# Patient Record
Sex: FEMALE | Race: OTHER | NOT HISPANIC OR LATINO
[De-identification: names, ages, dates, MRNs, and addresses within clinical notes are randomized per-mention and may not be internally consistent; named-entity substitution may affect disease eponyms.]

---

## 2017-08-16 ENCOUNTER — RX ONLY (OUTPATIENT)
Age: 52
Setting detail: RX ONLY
End: 2017-08-16

## 2019-04-03 ENCOUNTER — HOSPITAL ENCOUNTER (OUTPATIENT)
Dept: RADIOLOGY | Facility: MEDICAL CENTER | Age: 54
End: 2019-04-03
Attending: OBSTETRICS & GYNECOLOGY
Payer: COMMERCIAL

## 2019-04-03 DIAGNOSIS — Z12.31 VISIT FOR SCREENING MAMMOGRAM: ICD-10-CM

## 2019-04-03 PROCEDURE — 77063 BREAST TOMOSYNTHESIS BI: CPT

## 2019-04-04 ENCOUNTER — HOSPITAL ENCOUNTER (OUTPATIENT)
Dept: RADIOLOGY | Facility: MEDICAL CENTER | Age: 54
End: 2019-04-04

## 2019-06-26 ENCOUNTER — OFFICE VISIT (OUTPATIENT)
Dept: NEUROLOGY | Facility: MEDICAL CENTER | Age: 54
End: 2019-06-26
Payer: COMMERCIAL

## 2019-06-26 VITALS
BODY MASS INDEX: 20.33 KG/M2 | TEMPERATURE: 98.7 F | HEIGHT: 65 IN | DIASTOLIC BLOOD PRESSURE: 68 MMHG | OXYGEN SATURATION: 99 % | HEART RATE: 70 BPM | SYSTOLIC BLOOD PRESSURE: 100 MMHG | RESPIRATION RATE: 16 BRPM | WEIGHT: 122 LBS

## 2019-06-26 DIAGNOSIS — G43.009 MIGRAINE WITHOUT AURA AND WITHOUT STATUS MIGRAINOSUS, NOT INTRACTABLE: ICD-10-CM

## 2019-06-26 PROCEDURE — 99204 OFFICE O/P NEW MOD 45 MIN: CPT | Performed by: PSYCHIATRY & NEUROLOGY

## 2019-06-26 RX ORDER — SUMATRIPTAN 25 MG/1
TABLET, FILM COATED ORAL
Refills: 1 | COMMUNITY
Start: 2019-06-11 | End: 2019-06-26 | Stop reason: SDUPTHER

## 2019-06-26 RX ORDER — LEVONORGESTREL AND ETHINYL ESTRADIOL 0.1-0.02MG
KIT ORAL
Refills: 4 | COMMUNITY
Start: 2019-05-07 | End: 2023-01-06

## 2019-06-26 RX ORDER — M-VIT,TX,IRON,MINS/CALC/FOLIC 27MG-0.4MG
1 TABLET ORAL DAILY
COMMUNITY
End: 2023-01-06

## 2019-06-26 RX ORDER — SUMATRIPTAN 50 MG/1
50 TABLET, FILM COATED ORAL
Qty: 9 TAB | Refills: 2 | Status: SHIPPED | OUTPATIENT
Start: 2019-06-26

## 2019-06-26 RX ORDER — LEVOTHYROXINE SODIUM 112 MCG
TABLET ORAL
Refills: 11 | COMMUNITY
Start: 2019-06-11 | End: 2023-01-06

## 2019-06-26 ASSESSMENT — ENCOUNTER SYMPTOMS
SORE THROAT: 0
FALLS: 0
EYE DISCHARGE: 0
BRUISES/BLEEDS EASILY: 0
ABDOMINAL PAIN: 0
SHORTNESS OF BREATH: 0
WEIGHT LOSS: 0
HALLUCINATIONS: 0
FEVER: 0

## 2019-06-26 NOTE — LETTER
[facility]   [address], [address]  [place] [address]  Phone: [phone]  Fax: [phone]              Nighat Arreguin  1965    Encounter Date: 6/26/2019    Aura Hernandez M.D.          PROGRESS NOTE:  Chief Complaint   Patient presents with   • New Patient     Migraines     Patient is referred by Gina Suarez P.A. for initial consult.    History of present illness:  Nighat Arreguin 54 y.o. female presents today for headaches.   History is obtained from patient.  and Patient is accompanied by self.    Duration/timing: ~2015, one headache every 3 months, duration of 3 days  Context: Headaches; she is a  - stressful; she has had a fall on driveway and landed on her head with normal head scan; of note patient is on Retin A 0.025% occasionally for sun spots  Location: right temporal/paritetal sometimes front; uncertain if on left  Quality: throbbing, sharp  Severity: moderate to severe  Modifying factors: worse with light, activity, noise  Associated signs/symptoms: rarely nausea  Denies: bladder incontinence, bowel incontinence, dizziness, vision changes, weakness, numbness/tingling, swallowing difficulties, speech disturbance, incoordination, depression, anxiety, loss of consciousness, hallucinations, abnormal movements, diplopia, autonomic symptoms, falls and snoring/apnea during sleep, reproducible pain     Patient has tried:  -Sumatriptan 25 mg as needed - some benefit, no side effects   -Excedrin Migraine    Past medical history:   History reviewed. No pertinent past medical history.    Past surgical history:   Past Surgical History:   Procedure Laterality Date   • MAMMOPLASTY AUGMENTATION  2001   • ENDOMETRIAL ABLATION         Family history:   Family History   Problem Relation Age of Onset   • Other Daughter         Migraines       Social history:   Social History   • Marital status:      Social History Main Topics   • Smoking status: Never Smoker   •  "Smokeless tobacco: Never Used   • Alcohol use No   • Drug use: No     Current medications:   Current Outpatient Prescriptions   Medication   • LESSINA-28 0.1-20 MG-MCG per tablet   • SYNTHROID 112 MCG Tab   • therapeutic multivitamin-minerals (THERAGRAN-M) Tab   • SUMAtriptan (IMITREX) 50 MG Tab     No current facility-administered medications for this visit.        Medication Allergy:  Allergies not on file    Review of Systems   Constitutional: Negative for fever and weight loss.   HENT: Negative for sore throat.    Eyes: Negative for discharge.   Respiratory: Negative for shortness of breath.    Cardiovascular: Negative for leg swelling.   Gastrointestinal: Negative for abdominal pain.   Genitourinary: Negative for dysuria.   Musculoskeletal: Negative for falls.   Skin: Negative for rash.   Neurological:        As per HPI   Endo/Heme/Allergies: Does not bruise/bleed easily.   Psychiatric/Behavioral: Negative for hallucinations.       Physical examination:   Vitals:    06/26/19 1100   BP: 100/68   BP Location: Left arm   Patient Position: Sitting   BP Cuff Size: Adult   Pulse: 70   Resp: 16   Temp: 37.1 °C (98.7 °F)   TempSrc: Temporal   SpO2: 99%   Weight: 55.3 kg (122 lb)   Height: 1.651 m (5' 5\")     General: Patient in well nourished in no apparent distress.  Eyes: Ophthalmoscopic examination of the optic discs and posterior elements reveals sharp disk margins, normal vessels and pigmentation on right.  Cannot visualize left well enough to characterize.  HENT: Normocephalic, atraumatic. No tenderness to palpation of the MARION/GLEN  Cardiovascular: No lower extremity edema.  Respiratory: Normal respiratory effort.   Skin: No appreciable signs of acute rashes or bruising.   Musculoskeletal: No signs of joint or muscle swelling.   Psychiatric: Pleasant.     NEUROLOGICAL EXAM:   Mental status: Awake, alert and fully oriented to person, place, time and situation. Normal attention, concentration and fund of knowledge " for education level.   Speech and language: Speech is fluent without errors and clear.  Cranial nerve exam:  II: Pupils are equally round and reactive to light. Visual fields are intact by confrontation.  III, IV, VI: EOMI, no diplopia, no ptosis.  V: Sensation to light touch is normal over V1-3 distributions bilaterally.  .  VII: Facial movements are symmetrical. There is no facial droop. .  VIII: Hearing intact to soft speech and finger rub bilaterally  IX: Palate elevates symmetrically, uvula is midline. Dysarthria is not present.  XI: Shoulder shrug are symmetrical and strong.   XII: Tongue protrudes midline.    Motor exam:  Muscle tone is normal in all 4 limbs. and No abnormal movements appreciated.    Muscle strength:     Right  Left  Deltoid   5/5  5/5      Biceps   5/5  5/5  Triceps  5/5  5/5   Wrist extensors 5/5  5/5  Wrist flexors  5/5  5/5     5/5  5/5  Interossei  5/5  5/5  Thenar (APB)  NT/5  NT/5   Hip flexors  5/5  5/5  Quadriceps  5/5  5/5    Hamstrings  5/5  5/5  Dorsiflexors  5/5  5/5  Plantarflexors  5/5  5/5  Toe extension  NT/5  NT/5  NT = not tested    Sensory exam:  Intact to Light touch in bilateral upper and lower extremity.    Deep tendon reflexes:       Right  Left  Biceps   1/4  1/4  Triceps  0/4  0/4  Brachioradialis 0/4  0/4  Knee jerk  1/4  1/4  Ankle jerk  0/4  0/4   bilateral toes are downgoing to plantar stimulation..    Coordination: shows a normal finger-nose-finger and heel to shin bilaterally.   Gait: Casual gait is normal.      ANCILLARY DATA REVIEWED:   Lab Data Review: none  Imaging: none  Records reviewed: Referral note personally reviewed.  Medications as documented in HPI.  Patient had been having increasing migraines.        ASSESSMENT AND PLAN:    1. Migraine without aura and without status migrainosus, not intractable: Based on clinical history.  Responding to as needed sumatriptan when taken appropriately.  No red flag symptoms of focal exam findings to suggest  secondary etiology.  Patient uses the occasional retinoic acid but is reportedly seldom and not regular.  Lower suspicion for IIH.  Trial of higher dose of sumatriptan.  Due to the frequency preventative therapy is not indicated at this time.  - SUMAtriptan (IMITREX) 50 MG Tab; Take 1 Tab by mouth Once PRN for Migraine for up to 1 dose.  Dispense: 9 Tab; Refill: 2  -Consider brain imaging if worsening, or other concerning symptoms      FOLLOW-UP: Return for 3-4 months.  To ensure adequate abortive therapy.  EDUCATION AND COUNSELING:  -I personally discussed the following with the patient:   Risks/benefits/side effects/alternatives of medication including but not limited to drowsiness, sedation, dizziness, hypersensitivity reactions including rash (which may be fatal), avoid pregnancy while taking prescribed drugs unless discussed with physician (fetal side effects include: congenital malformations, developmental and intellectual disability) and Chest pain/shortness of breath/hypertension., Diagnosis, prognosis, and treatment options discussed with patient at length.   and Recommend regular exercise, proper hydration, healthy diet and stress reduction.     The patient understands and agrees that due to the complexity of his/her diagnosis, results of any testing and further recommendations will typically be discussed/made during a face to face encounter in my office. The patient and/or family further understands it is their responsibility to keep proper follow up.     Disclaimer  This dictation was created using voice recognition software. I have made every reasonable attempt to avoid dictation errors, but this document may contain an error not identified before finalizing. If the error changes the accuracy of the document, I would appreciate it being brought to my attention. Thank you very much.     Aura Hernandez MD  Neurology, Neurophysiology  [facility]      No Recipients

## 2019-06-26 NOTE — PROGRESS NOTES
Chief Complaint   Patient presents with   • New Patient     Migraines     Patient is referred by Gina Suarez P.A. for initial consult.    History of present illness:  Nighat Arreguin 54 y.o. female presents today for headaches.   History is obtained from patient.  and Patient is accompanied by self.    Duration/timing: ~2015, one headache every 3 months, duration of 3 days  Context: Headaches; she is a  - stressful; she has had a fall on driveway and landed on her head with normal head scan; of note patient is on Retin A 0.025% occasionally for sun spots  Location: right temporal/paritetal sometimes front; uncertain if on left  Quality: throbbing, sharp  Severity: moderate to severe  Modifying factors: worse with light, activity, noise  Associated signs/symptoms: rarely nausea  Denies: bladder incontinence, bowel incontinence, dizziness, vision changes, weakness, numbness/tingling, swallowing difficulties, speech disturbance, incoordination, depression, anxiety, loss of consciousness, hallucinations, abnormal movements, diplopia, autonomic symptoms, falls and snoring/apnea during sleep, reproducible pain     Patient has tried:  -Sumatriptan 25 mg as needed - some benefit, no side effects   -Excedrin Migraine    Past medical history:   History reviewed. No pertinent past medical history.    Past surgical history:   Past Surgical History:   Procedure Laterality Date   • MAMMOPLASTY AUGMENTATION  2001   • ENDOMETRIAL ABLATION         Family history:   Family History   Problem Relation Age of Onset   • Other Daughter         Migraines       Social history:   Social History   • Marital status:      Social History Main Topics   • Smoking status: Never Smoker   • Smokeless tobacco: Never Used   • Alcohol use No   • Drug use: No     Current medications:   Current Outpatient Prescriptions   Medication   • LESSINA-28 0.1-20 MG-MCG per tablet   • SYNTHROID 112 MCG Tab   • therapeutic multivitamin-minerals  "(THERAGRAN-M) Tab   • SUMAtriptan (IMITREX) 50 MG Tab     No current facility-administered medications for this visit.        Medication Allergy:  Allergies not on file    Review of Systems   Constitutional: Negative for fever and weight loss.   HENT: Negative for sore throat.    Eyes: Negative for discharge.   Respiratory: Negative for shortness of breath.    Cardiovascular: Negative for leg swelling.   Gastrointestinal: Negative for abdominal pain.   Genitourinary: Negative for dysuria.   Musculoskeletal: Negative for falls.   Skin: Negative for rash.   Neurological:        As per HPI   Endo/Heme/Allergies: Does not bruise/bleed easily.   Psychiatric/Behavioral: Negative for hallucinations.       Physical examination:   Vitals:    06/26/19 1100   BP: 100/68   BP Location: Left arm   Patient Position: Sitting   BP Cuff Size: Adult   Pulse: 70   Resp: 16   Temp: 37.1 °C (98.7 °F)   TempSrc: Temporal   SpO2: 99%   Weight: 55.3 kg (122 lb)   Height: 1.651 m (5' 5\")     General: Patient in well nourished in no apparent distress.  Eyes: Ophthalmoscopic examination of the optic discs and posterior elements reveals sharp disk margins, normal vessels and pigmentation on right.  Cannot visualize left well enough to characterize.  HENT: Normocephalic, atraumatic. No tenderness to palpation of the MARION/GLEN  Cardiovascular: No lower extremity edema.  Respiratory: Normal respiratory effort.   Skin: No appreciable signs of acute rashes or bruising.   Musculoskeletal: No signs of joint or muscle swelling.   Psychiatric: Pleasant.     NEUROLOGICAL EXAM:   Mental status: Awake, alert and fully oriented to person, place, time and situation. Normal attention, concentration and fund of knowledge for education level.   Speech and language: Speech is fluent without errors and clear.  Cranial nerve exam:  II: Pupils are equally round and reactive to light. Visual fields are intact by confrontation.  III, IV, VI: EOMI, no diplopia, no " ptosis.  V: Sensation to light touch is normal over V1-3 distributions bilaterally.  .  VII: Facial movements are symmetrical. There is no facial droop. .  VIII: Hearing intact to soft speech and finger rub bilaterally  IX: Palate elevates symmetrically, uvula is midline. Dysarthria is not present.  XI: Shoulder shrug are symmetrical and strong.   XII: Tongue protrudes midline.    Motor exam:  Muscle tone is normal in all 4 limbs. and No abnormal movements appreciated.    Muscle strength:     Right  Left  Deltoid   5/5  5/5      Biceps   5/5  5/5  Triceps  5/5  5/5   Wrist extensors 5/5  5/5  Wrist flexors  5/5  5/5     5/5  5/5  Interossei  5/5  5/5  Thenar (APB)  NT/5  NT/5   Hip flexors  5/5  5/5  Quadriceps  5/5  5/5    Hamstrings  5/5  5/5  Dorsiflexors  5/5  5/5  Plantarflexors  5/5  5/5  Toe extension  NT/5  NT/5  NT = not tested    Sensory exam:  Intact to Light touch in bilateral upper and lower extremity.    Deep tendon reflexes:       Right  Left  Biceps   1/4  1/4  Triceps  0/4  0/4  Brachioradialis 0/4  0/4  Knee jerk  1/4  1/4  Ankle jerk  0/4  0/4   bilateral toes are downgoing to plantar stimulation..    Coordination: shows a normal finger-nose-finger and heel to shin bilaterally.   Gait: Casual gait is normal.      ANCILLARY DATA REVIEWED:   Lab Data Review: none  Imaging: none  Records reviewed: Referral note personally reviewed.  Medications as documented in HPI.  Patient had been having increasing migraines.        ASSESSMENT AND PLAN:    1. Migraine without aura and without status migrainosus, not intractable: Based on clinical history.  Responding to as needed sumatriptan when taken appropriately.  No red flag symptoms of focal exam findings to suggest secondary etiology.  Patient uses the occasional retinoic acid but is reportedly seldom and not regular.  Lower suspicion for IIH.  Trial of higher dose of sumatriptan.  Due to the frequency preventative therapy is not indicated at this  time.  - SUMAtriptan (IMITREX) 50 MG Tab; Take 1 Tab by mouth Once PRN for Migraine for up to 1 dose.  Dispense: 9 Tab; Refill: 2  -Consider brain imaging if worsening, or other concerning symptoms      FOLLOW-UP: Return for 3-4 months.  To ensure adequate abortive therapy.  EDUCATION AND COUNSELING:  -I personally discussed the following with the patient:   Risks/benefits/side effects/alternatives of medication including but not limited to drowsiness, sedation, dizziness, hypersensitivity reactions including rash (which may be fatal), avoid pregnancy while taking prescribed drugs unless discussed with physician (fetal side effects include: congenital malformations, developmental and intellectual disability) and Chest pain/shortness of breath/hypertension., Diagnosis, prognosis, and treatment options discussed with patient at length.   and Recommend regular exercise, proper hydration, healthy diet and stress reduction.     The patient understands and agrees that due to the complexity of his/her diagnosis, results of any testing and further recommendations will typically be discussed/made during a face to face encounter in my office. The patient and/or family further understands it is their responsibility to keep proper follow up.     Disclaimer  This dictation was created using voice recognition software. I have made every reasonable attempt to avoid dictation errors, but this document may contain an error not identified before finalizing. If the error changes the accuracy of the document, I would appreciate it being brought to my attention. Thank you very much.     Aura Hernandez MD  Neurology, Neurophysiology  [facility]

## 2020-03-26 ENCOUNTER — HOSPITAL ENCOUNTER (OUTPATIENT)
Dept: LAB | Facility: MEDICAL CENTER | Age: 55
End: 2020-03-26
Attending: OBSTETRICS & GYNECOLOGY
Payer: COMMERCIAL

## 2020-03-26 LAB
FSH SERPL-ACNC: 193 MIU/ML
LH SERPL-ACNC: 75.9 IU/L

## 2020-03-26 PROCEDURE — 36415 COLL VENOUS BLD VENIPUNCTURE: CPT

## 2020-03-26 PROCEDURE — 83001 ASSAY OF GONADOTROPIN (FSH): CPT

## 2020-03-26 PROCEDURE — 83002 ASSAY OF GONADOTROPIN (LH): CPT

## 2020-09-08 ENCOUNTER — TELEPHONE (OUTPATIENT)
Dept: DERMATOLOGY | Facility: IMAGING CENTER | Age: 55
End: 2020-09-08

## 2020-09-08 NOTE — TELEPHONE ENCOUNTER
NATASHA for patient asking her to return my call at [facility]. I received her online inquiry about scheduling an appointment with our office. Phone number was provided.

## 2021-07-02 ENCOUNTER — HOSPITAL ENCOUNTER (OUTPATIENT)
Dept: RADIOLOGY | Facility: MEDICAL CENTER | Age: 56
End: 2021-07-02
Attending: OBSTETRICS & GYNECOLOGY
Payer: COMMERCIAL

## 2021-07-02 DIAGNOSIS — Z12.31 VISIT FOR SCREENING MAMMOGRAM: ICD-10-CM

## 2021-07-02 PROCEDURE — 77063 BREAST TOMOSYNTHESIS BI: CPT

## 2023-01-06 ENCOUNTER — HOSPITAL ENCOUNTER (OUTPATIENT)
Facility: MEDICAL CENTER | Age: 58
End: 2023-01-06
Attending: STUDENT IN AN ORGANIZED HEALTH CARE EDUCATION/TRAINING PROGRAM
Payer: COMMERCIAL

## 2023-01-06 ENCOUNTER — OFFICE VISIT (OUTPATIENT)
Dept: URGENT CARE | Facility: PHYSICIAN GROUP | Age: 58
End: 2023-01-06
Payer: COMMERCIAL

## 2023-01-06 VITALS
BODY MASS INDEX: 20.63 KG/M2 | SYSTOLIC BLOOD PRESSURE: 112 MMHG | TEMPERATURE: 98.3 F | DIASTOLIC BLOOD PRESSURE: 72 MMHG | HEART RATE: 68 BPM | WEIGHT: 128.4 LBS | OXYGEN SATURATION: 97 % | HEIGHT: 66 IN

## 2023-01-06 DIAGNOSIS — R30.0 DYSURIA: ICD-10-CM

## 2023-01-06 LAB
APPEARANCE UR: NORMAL
BILIRUB UR STRIP-MCNC: NEGATIVE MG/DL
COLOR UR AUTO: YELLOW
GLUCOSE UR STRIP.AUTO-MCNC: NEGATIVE MG/DL
KETONES UR STRIP.AUTO-MCNC: NEGATIVE MG/DL
LEUKOCYTE ESTERASE UR QL STRIP.AUTO: NORMAL
NITRITE UR QL STRIP.AUTO: NEGATIVE
PH UR STRIP.AUTO: 6 [PH] (ref 5–8)
PROT UR QL STRIP: NEGATIVE MG/DL
RBC UR QL AUTO: NORMAL
SP GR UR STRIP.AUTO: 1.01
UROBILINOGEN UR STRIP-MCNC: 0.2 MG/DL

## 2023-01-06 PROCEDURE — 87186 SC STD MICRODIL/AGAR DIL: CPT

## 2023-01-06 PROCEDURE — 87086 URINE CULTURE/COLONY COUNT: CPT

## 2023-01-06 PROCEDURE — 81002 URINALYSIS NONAUTO W/O SCOPE: CPT | Performed by: STUDENT IN AN ORGANIZED HEALTH CARE EDUCATION/TRAINING PROGRAM

## 2023-01-06 PROCEDURE — 99203 OFFICE O/P NEW LOW 30 MIN: CPT | Performed by: STUDENT IN AN ORGANIZED HEALTH CARE EDUCATION/TRAINING PROGRAM

## 2023-01-06 PROCEDURE — 87077 CULTURE AEROBIC IDENTIFY: CPT

## 2023-01-06 RX ORDER — LEVOTHYROXINE SODIUM 88 UG/1
1 TABLET ORAL DAILY
COMMUNITY
Start: 2022-04-01 | End: 2023-03-31

## 2023-01-06 RX ORDER — SUMATRIPTAN 50 MG/1
TABLET, FILM COATED ORAL
COMMUNITY
Start: 2022-10-10 | End: 2023-01-06

## 2023-01-06 RX ORDER — CEFDINIR 300 MG/1
300 CAPSULE ORAL 2 TIMES DAILY
Qty: 10 CAPSULE | Refills: 0 | Status: SHIPPED | OUTPATIENT
Start: 2023-01-06 | End: 2023-01-11

## 2023-01-06 NOTE — PROGRESS NOTES
Subjective:   CHIEF COMPLAINT  Chief Complaint   Patient presents with    Painful Urination     Started with side pain in lower L back, concerns about kidney stones, pain, burning when peeing, noticed blood and severe pain, concerned about kidney stones, OTC: Azo, IBU       HPI  Nighat Arreguin is a 57 y.o. female who presents with a chief complaint of urinary frequency, burning with urination and slight blood in urine for approximately 1 week.  What ever she is currently using AZO and cranberry juice.  No current left flank pain, abdominal pain, fevers, nausea or vomiting.  No abnormal vaginal discharge or pruritus.  No concerns for STIs.    On ROS patient reports last month she was experiencing colicky left flank pain.  Symptoms return every few days described as sharp.  Then last week she had 1 day of severe pain and believes she possibly passed a stone.  She is no longer experiencing flank pain.  Negative ROS as described above.    REVIEW OF SYSTEMS  General: no fever or chills  GI: no nausea or vomiting  See HPI for further details.    PAST MEDICAL HISTORY  Patient Active Problem List    Diagnosis Date Noted    Migraine without aura and without status migrainosus, not intractable 06/26/2019       SURGICAL HISTORY   has a past surgical history that includes mammoplasty augmentation (2001) and endometrial ablation.    ALLERGIES  No Known Allergies    CURRENT MEDICATIONS  Home Medications       Reviewed by Guillermo Guevara D.O. (Physician) on 01/06/23 at 1336  Med List Status: <None>     Medication Last Dose Status   cefdinir (OMNICEF) 300 MG Cap  Active   levothyroxine (SYNTHROID) 88 MCG Tab Taking Active   SUMAtriptan (IMITREX) 50 MG Tab PRN Active                    SOCIAL HISTORY  Social History     Tobacco Use    Smoking status: Never    Smokeless tobacco: Never   Substance and Sexual Activity    Alcohol use: No    Drug use: No    Sexual activity: Not on file       FAMILY HISTORY  Family History   Problem  "Relation Age of Onset    Other Daughter         Migraines          Objective:   PHYSICAL EXAM  VITAL SIGNS: /72   Pulse 68   Temp 36.8 °C (98.3 °F) (Temporal)   Ht 1.676 m (5' 6\")   Wt 58.2 kg (128 lb 6.4 oz)   SpO2 97%   BMI 20.72 kg/m²     Gen: no acute distress, normal voice  Skin: dry, intact, moist mucosal membranes  Lungs: CTAB w/ symmetric expansion  CV: RRR w/o murmurs or clicks  : No CVAT bilaterally.  Abdomen: Soft, no distention, no TTP.  Psych: normal affect, normal judgement, alert, awake    UA: Invalid; patient on AZO    Assessment/Plan:     1. Dysuria  POCT Urinalysis    cefdinir (OMNICEF) 300 MG Cap    URINE CULTURE(NEW)      History consistent with acute cystitis.  - Ordered Rx for Omnicef  - 2 L H2O daily  - Ordered urine culture  - Return to urgent care any new/worsening symptoms or further questions or concerns.  Patient understood everything discussed.  All questions were answered.      Differential diagnosis, natural history, supportive care, and indications for immediate follow-up discussed. All questions answered. Patient agrees with the plan of care.    Follow-up as needed if symptoms worsen or fail to improve to PCP, Urgent care or Emergency Room.    Please note that this dictation was created using voice recognition software. I have made a reasonable attempt to correct obvious errors, but I expect that there are errors of grammar and possibly content that I did not discover before finalizing the note.         "

## 2023-01-09 LAB
BACTERIA UR CULT: ABNORMAL
BACTERIA UR CULT: ABNORMAL
SIGNIFICANT IND 70042: ABNORMAL
SITE SITE: ABNORMAL
SOURCE SOURCE: ABNORMAL

## 2023-04-07 ENCOUNTER — HOSPITAL ENCOUNTER (OUTPATIENT)
Dept: RADIOLOGY | Facility: MEDICAL CENTER | Age: 58
End: 2023-04-07
Attending: OBSTETRICS & GYNECOLOGY
Payer: COMMERCIAL

## 2023-04-07 DIAGNOSIS — Z12.31 VISIT FOR SCREENING MAMMOGRAM: ICD-10-CM

## 2023-04-07 PROCEDURE — 77063 BREAST TOMOSYNTHESIS BI: CPT

## 2023-10-10 ENCOUNTER — APPOINTMENT (RX ONLY)
Dept: URBAN - METROPOLITAN AREA CLINIC 22 | Facility: CLINIC | Age: 58
Setting detail: DERMATOLOGY
End: 2023-10-10

## 2023-10-10 DIAGNOSIS — Z71.89 OTHER SPECIFIED COUNSELING: ICD-10-CM

## 2023-10-10 DIAGNOSIS — D22 MELANOCYTIC NEVI: ICD-10-CM

## 2023-10-10 DIAGNOSIS — L57.0 ACTINIC KERATOSIS: ICD-10-CM

## 2023-10-10 DIAGNOSIS — L82.0 INFLAMED SEBORRHEIC KERATOSIS: ICD-10-CM

## 2023-10-10 DIAGNOSIS — L81.4 OTHER MELANIN HYPERPIGMENTATION: ICD-10-CM

## 2023-10-10 DIAGNOSIS — D18.0 HEMANGIOMA: ICD-10-CM

## 2023-10-10 DIAGNOSIS — L82.1 OTHER SEBORRHEIC KERATOSIS: ICD-10-CM

## 2023-10-10 PROBLEM — D22.5 MELANOCYTIC NEVI OF TRUNK: Status: ACTIVE | Noted: 2023-10-10

## 2023-10-10 PROBLEM — D48.5 NEOPLASM OF UNCERTAIN BEHAVIOR OF SKIN: Status: ACTIVE | Noted: 2023-10-10

## 2023-10-10 PROBLEM — D18.01 HEMANGIOMA OF SKIN AND SUBCUTANEOUS TISSUE: Status: ACTIVE | Noted: 2023-10-10

## 2023-10-10 PROCEDURE — ? COUNSELING

## 2023-10-10 PROCEDURE — 17000 DESTRUCT PREMALG LESION: CPT | Mod: 59

## 2023-10-10 PROCEDURE — ? LIQUID NITROGEN

## 2023-10-10 PROCEDURE — 99203 OFFICE O/P NEW LOW 30 MIN: CPT | Mod: 25

## 2023-10-10 PROCEDURE — 11102 TANGNTL BX SKIN SINGLE LES: CPT | Mod: 59

## 2023-10-10 PROCEDURE — ? BIOPSY BY SHAVE METHOD

## 2023-10-10 PROCEDURE — 17003 DESTRUCT PREMALG LES 2-14: CPT | Mod: 59

## 2023-10-10 PROCEDURE — 17110 DESTRUCTION B9 LES UP TO 14: CPT

## 2023-10-10 PROCEDURE — ? SUNSCREEN RECOMMENDATIONS

## 2023-10-10 ASSESSMENT — LOCATION SIMPLE DESCRIPTION DERM
LOCATION SIMPLE: RIGHT WRIST
LOCATION SIMPLE: ABDOMEN
LOCATION SIMPLE: CHEST
LOCATION SIMPLE: RIGHT UPPER BACK
LOCATION SIMPLE: RIGHT FOREARM
LOCATION SIMPLE: LEFT UPPER BACK
LOCATION SIMPLE: RIGHT LOWER BACK
LOCATION SIMPLE: UPPER BACK

## 2023-10-10 ASSESSMENT — LOCATION DETAILED DESCRIPTION DERM
LOCATION DETAILED: MIDDLE STERNUM
LOCATION DETAILED: LEFT SUPERIOR MEDIAL UPPER BACK
LOCATION DETAILED: EPIGASTRIC SKIN
LOCATION DETAILED: RIGHT MID-UPPER BACK
LOCATION DETAILED: RIGHT INFERIOR LATERAL UPPER BACK
LOCATION DETAILED: RIGHT LATERAL SUPERIOR CHEST
LOCATION DETAILED: LEFT LATERAL ABDOMEN
LOCATION DETAILED: RIGHT DISTAL DORSAL FOREARM
LOCATION DETAILED: INFERIOR THORACIC SPINE
LOCATION DETAILED: LEFT MEDIAL SUPERIOR CHEST
LOCATION DETAILED: LEFT SUPERIOR UPPER BACK
LOCATION DETAILED: RIGHT DORSAL WRIST
LOCATION DETAILED: RIGHT SUPERIOR MEDIAL MIDBACK
LOCATION DETAILED: UPPER STERNUM
LOCATION DETAILED: RIGHT MEDIAL SUPERIOR CHEST

## 2023-10-10 ASSESSMENT — LOCATION ZONE DERM
LOCATION ZONE: ARM
LOCATION ZONE: TRUNK

## 2023-10-10 NOTE — PROCEDURE: LIQUID NITROGEN
Show Aperture Variable?: Yes
Detail Level: Detailed
Duration Of Freeze Thaw-Cycle (Seconds): 3
Post-Care Instructions: I reviewed with the patient in detail post-care instructions. Patient is to wear sunprotection, and avoid picking at any of the treated lesions. Pt may apply Vaseline to crusted or scabbing areas.
Medical Necessity Information: It is in your best interest to select a reason for this procedure from the list below. All of these items fulfill various CMS LCD requirements except the new and changing color options.
Consent: The patient's consent was obtained including but not limited to risks of crusting, scabbing, blistering, scarring, darker or lighter pigmentary change, recurrence, incomplete removal and infection.
Application Tool (Optional): Liquid Nitrogen Sprayer
Medical Necessity Clause: This procedure was medically necessary because the lesions that were treated were:
Spray Paint Technique: No
Number Of Freeze-Thaw Cycles: 3 freeze-thaw cycles
Spray Paint Text: The liquid nitrogen was applied to the skin utilizing a spray paint frosting technique.
Number Of Freeze-Thaw Cycles: 2 freeze-thaw cycles

## 2024-07-05 ENCOUNTER — HOSPITAL ENCOUNTER (OUTPATIENT)
Dept: RADIOLOGY | Facility: MEDICAL CENTER | Age: 59
End: 2024-07-05
Attending: OBSTETRICS & GYNECOLOGY
Payer: COMMERCIAL

## 2024-07-05 DIAGNOSIS — Z12.31 SCREENING MAMMOGRAM, ENCOUNTER FOR: ICD-10-CM

## 2024-07-05 PROCEDURE — 77063 BREAST TOMOSYNTHESIS BI: CPT

## 2024-08-02 ENCOUNTER — APPOINTMENT (RX ONLY)
Dept: URBAN - METROPOLITAN AREA CLINIC 4 | Facility: CLINIC | Age: 59
Setting detail: DERMATOLOGY
End: 2024-08-02

## 2024-08-02 DIAGNOSIS — D485 NEOPLASM OF UNCERTAIN BEHAVIOR OF SKIN: ICD-10-CM

## 2024-08-02 DIAGNOSIS — L81.4 OTHER MELANIN HYPERPIGMENTATION: ICD-10-CM

## 2024-08-02 DIAGNOSIS — L82.1 OTHER SEBORRHEIC KERATOSIS: ICD-10-CM

## 2024-08-02 DIAGNOSIS — Z71.89 OTHER SPECIFIED COUNSELING: ICD-10-CM

## 2024-08-02 PROBLEM — D48.5 NEOPLASM OF UNCERTAIN BEHAVIOR OF SKIN: Status: ACTIVE | Noted: 2024-08-02

## 2024-08-02 PROCEDURE — 11102 TANGNTL BX SKIN SINGLE LES: CPT

## 2024-08-02 PROCEDURE — ? COUNSELING

## 2024-08-02 PROCEDURE — ? ADDITIONAL NOTES

## 2024-08-02 PROCEDURE — 99213 OFFICE O/P EST LOW 20 MIN: CPT | Mod: 25

## 2024-08-02 PROCEDURE — ? BIOPSY BY SHAVE METHOD

## 2024-08-02 PROCEDURE — ? SUNSCREEN RECOMMENDATIONS

## 2024-08-02 ASSESSMENT — LOCATION SIMPLE DESCRIPTION DERM
LOCATION SIMPLE: RIGHT HAND
LOCATION SIMPLE: LEFT FOREARM
LOCATION SIMPLE: RIGHT FOREARM
LOCATION SIMPLE: LEFT POSTERIOR UPPER ARM

## 2024-08-02 ASSESSMENT — LOCATION ZONE DERM
LOCATION ZONE: ARM
LOCATION ZONE: HAND

## 2024-08-02 ASSESSMENT — LOCATION DETAILED DESCRIPTION DERM
LOCATION DETAILED: RIGHT DORSAL RING METACARPOPHALANGEAL JOINT
LOCATION DETAILED: LEFT VENTRAL DISTAL FOREARM
LOCATION DETAILED: RIGHT PROXIMAL DORSAL FOREARM
LOCATION DETAILED: LEFT DISTAL LATERAL POSTERIOR UPPER ARM

## 2024-08-02 NOTE — PROCEDURE: ADDITIONAL NOTES
Render Risk Assessment In Note?: no
Detail Level: Simple
Additional Notes: Treated in office with LN2
Additional Notes: Treated in office with LN2.

## 2024-10-10 ENCOUNTER — APPOINTMENT (RX ONLY)
Dept: URBAN - METROPOLITAN AREA CLINIC 22 | Facility: CLINIC | Age: 59
Setting detail: DERMATOLOGY
End: 2024-10-10

## 2024-10-10 DIAGNOSIS — L81.4 OTHER MELANIN HYPERPIGMENTATION: ICD-10-CM

## 2024-10-10 DIAGNOSIS — D18.0 HEMANGIOMA: ICD-10-CM

## 2024-10-10 DIAGNOSIS — Z71.89 OTHER SPECIFIED COUNSELING: ICD-10-CM

## 2024-10-10 DIAGNOSIS — L57.0 ACTINIC KERATOSIS: ICD-10-CM

## 2024-10-10 DIAGNOSIS — L91.8 OTHER HYPERTROPHIC DISORDERS OF THE SKIN: ICD-10-CM

## 2024-10-10 DIAGNOSIS — D22 MELANOCYTIC NEVI: ICD-10-CM

## 2024-10-10 DIAGNOSIS — L82.1 OTHER SEBORRHEIC KERATOSIS: ICD-10-CM

## 2024-10-10 PROBLEM — D22.5 MELANOCYTIC NEVI OF TRUNK: Status: ACTIVE | Noted: 2024-10-10

## 2024-10-10 PROBLEM — D18.01 HEMANGIOMA OF SKIN AND SUBCUTANEOUS TISSUE: Status: ACTIVE | Noted: 2024-10-10

## 2024-10-10 PROCEDURE — 17000 DESTRUCT PREMALG LESION: CPT

## 2024-10-10 PROCEDURE — 99213 OFFICE O/P EST LOW 20 MIN: CPT | Mod: 25

## 2024-10-10 PROCEDURE — ? LIQUID NITROGEN

## 2024-10-10 PROCEDURE — ? COUNSELING

## 2024-10-10 PROCEDURE — 17003 DESTRUCT PREMALG LES 2-14: CPT

## 2024-10-10 PROCEDURE — ? SUNSCREEN RECOMMENDATIONS

## 2024-10-10 ASSESSMENT — LOCATION DETAILED DESCRIPTION DERM
LOCATION DETAILED: RIGHT LATERAL SUPERIOR CHEST
LOCATION DETAILED: LEFT SUPERIOR MEDIAL UPPER BACK
LOCATION DETAILED: EPIGASTRIC SKIN
LOCATION DETAILED: LEFT RIB CAGE
LOCATION DETAILED: UPPER STERNUM
LOCATION DETAILED: RIGHT SUPERIOR FOREHEAD
LOCATION DETAILED: RIGHT SUPERIOR MEDIAL MIDBACK
LOCATION DETAILED: LEFT LATERAL SUPERIOR CHEST

## 2024-10-10 ASSESSMENT — LOCATION SIMPLE DESCRIPTION DERM
LOCATION SIMPLE: RIGHT LOWER BACK
LOCATION SIMPLE: LEFT UPPER BACK
LOCATION SIMPLE: ABDOMEN
LOCATION SIMPLE: RIGHT FOREHEAD
LOCATION SIMPLE: CHEST

## 2024-10-10 ASSESSMENT — LOCATION ZONE DERM
LOCATION ZONE: TRUNK
LOCATION ZONE: FACE

## 2024-10-10 NOTE — PROCEDURE: LIQUID NITROGEN
Consent: The patient's consent was obtained including but not limited to risks of crusting, scabbing, blistering, scarring, darker or lighter pigmentary change, recurrence, incomplete removal and infection.
Post-Care Instructions: I reviewed with the patient in detail post-care instructions. Patient is to wear sunprotection, and avoid picking at any of the treated lesions. Pt may apply Vaseline to crusted or scabbing areas.
Number Of Freeze-Thaw Cycles: 2 freeze-thaw cycles
Render Post-Care Instructions In Note?: no
Detail Level: Detailed
Duration Of Freeze Thaw-Cycle (Seconds): 3
Show Aperture Variable?: Yes
Spray Paint Text: The liquid nitrogen was applied to the skin utilizing a spray paint frosting technique.
Duration Of Freeze Thaw-Cycle (Seconds): 0
Medical Necessity Information: It is in your best interest to select a reason for this procedure from the list below. All of these items fulfill various CMS LCD requirements except the new and changing color options.
Medical Necessity Clause: This procedure was medically necessary because the lesions that were treated were:

## 2025-01-06 ENCOUNTER — HOSPITAL ENCOUNTER (EMERGENCY)
Facility: MEDICAL CENTER | Age: 60
End: 2025-01-06
Attending: EMERGENCY MEDICINE
Payer: COMMERCIAL

## 2025-01-06 ENCOUNTER — APPOINTMENT (OUTPATIENT)
Dept: RADIOLOGY | Facility: MEDICAL CENTER | Age: 60
End: 2025-01-06
Attending: EMERGENCY MEDICINE
Payer: COMMERCIAL

## 2025-01-06 VITALS
WEIGHT: 123.46 LBS | BODY MASS INDEX: 20.57 KG/M2 | SYSTOLIC BLOOD PRESSURE: 100 MMHG | OXYGEN SATURATION: 96 % | DIASTOLIC BLOOD PRESSURE: 60 MMHG | HEART RATE: 74 BPM | TEMPERATURE: 96.7 F | RESPIRATION RATE: 20 BRPM | HEIGHT: 65 IN

## 2025-01-06 DIAGNOSIS — R55 SYNCOPE, UNSPECIFIED SYNCOPE TYPE: ICD-10-CM

## 2025-01-06 DIAGNOSIS — S01.81XA FACIAL LACERATION, INITIAL ENCOUNTER: ICD-10-CM

## 2025-01-06 DIAGNOSIS — S09.90XA CLOSED HEAD INJURY, INITIAL ENCOUNTER: ICD-10-CM

## 2025-01-06 LAB
ALBUMIN SERPL BCP-MCNC: 4.3 G/DL (ref 3.2–4.9)
ALBUMIN/GLOB SERPL: 1.7 G/DL
ALP SERPL-CCNC: 71 U/L (ref 30–99)
ALT SERPL-CCNC: 11 U/L (ref 2–50)
ANION GAP SERPL CALC-SCNC: 7 MMOL/L (ref 7–16)
AST SERPL-CCNC: 25 U/L (ref 12–45)
BASOPHILS # BLD AUTO: 0.3 % (ref 0–1.8)
BASOPHILS # BLD: 0.02 K/UL (ref 0–0.12)
BILIRUB SERPL-MCNC: 0.4 MG/DL (ref 0.1–1.5)
BUN SERPL-MCNC: 21 MG/DL (ref 8–22)
CALCIUM ALBUM COR SERPL-MCNC: 8.3 MG/DL (ref 8.5–10.5)
CALCIUM SERPL-MCNC: 8.5 MG/DL (ref 8.5–10.5)
CHLORIDE SERPL-SCNC: 104 MMOL/L (ref 96–112)
CO2 SERPL-SCNC: 24 MMOL/L (ref 20–33)
CREAT SERPL-MCNC: 0.91 MG/DL (ref 0.5–1.4)
EKG IMPRESSION: NORMAL
EOSINOPHIL # BLD AUTO: 0.09 K/UL (ref 0–0.51)
EOSINOPHIL NFR BLD: 1.2 % (ref 0–6.9)
ERYTHROCYTE [DISTWIDTH] IN BLOOD BY AUTOMATED COUNT: 44.4 FL (ref 35.9–50)
GFR SERPLBLD CREATININE-BSD FMLA CKD-EPI: 73 ML/MIN/1.73 M 2
GLOBULIN SER CALC-MCNC: 2.6 G/DL (ref 1.9–3.5)
GLUCOSE SERPL-MCNC: 92 MG/DL (ref 65–99)
HCT VFR BLD AUTO: 41.1 % (ref 37–47)
HGB BLD-MCNC: 13.9 G/DL (ref 12–16)
IMM GRANULOCYTES # BLD AUTO: 0.03 K/UL (ref 0–0.11)
IMM GRANULOCYTES NFR BLD AUTO: 0.4 % (ref 0–0.9)
LYMPHOCYTES # BLD AUTO: 2.13 K/UL (ref 1–4.8)
LYMPHOCYTES NFR BLD: 29.3 % (ref 22–41)
MCH RBC QN AUTO: 31.3 PG (ref 27–33)
MCHC RBC AUTO-ENTMCNC: 33.8 G/DL (ref 32.2–35.5)
MCV RBC AUTO: 92.6 FL (ref 81.4–97.8)
MONOCYTES # BLD AUTO: 0.62 K/UL (ref 0–0.85)
MONOCYTES NFR BLD AUTO: 8.5 % (ref 0–13.4)
NEUTROPHILS # BLD AUTO: 4.37 K/UL (ref 1.82–7.42)
NEUTROPHILS NFR BLD: 60.3 % (ref 44–72)
NRBC # BLD AUTO: 0 K/UL
NRBC BLD-RTO: 0 /100 WBC (ref 0–0.2)
PLATELET # BLD AUTO: 265 K/UL (ref 164–446)
PMV BLD AUTO: 9.1 FL (ref 9–12.9)
POTASSIUM SERPL-SCNC: 3.7 MMOL/L (ref 3.6–5.5)
PROT SERPL-MCNC: 6.9 G/DL (ref 6–8.2)
RBC # BLD AUTO: 4.44 M/UL (ref 4.2–5.4)
SODIUM SERPL-SCNC: 135 MMOL/L (ref 135–145)
TROPONIN T SERPL-MCNC: <6 NG/L (ref 6–19)
WBC # BLD AUTO: 7.3 K/UL (ref 4.8–10.8)

## 2025-01-06 PROCEDURE — 85025 COMPLETE CBC W/AUTO DIFF WBC: CPT

## 2025-01-06 PROCEDURE — 80053 COMPREHEN METABOLIC PANEL: CPT

## 2025-01-06 PROCEDURE — 36415 COLL VENOUS BLD VENIPUNCTURE: CPT

## 2025-01-06 PROCEDURE — 304217 HCHG IRRIGATION SYSTEM

## 2025-01-06 PROCEDURE — 303747 HCHG EXTRA SUTURE

## 2025-01-06 PROCEDURE — 93005 ELECTROCARDIOGRAM TRACING: CPT | Mod: TC | Performed by: EMERGENCY MEDICINE

## 2025-01-06 PROCEDURE — 99284 EMERGENCY DEPT VISIT MOD MDM: CPT

## 2025-01-06 PROCEDURE — 73562 X-RAY EXAM OF KNEE 3: CPT | Mod: RT

## 2025-01-06 PROCEDURE — 93005 ELECTROCARDIOGRAM TRACING: CPT | Mod: TC

## 2025-01-06 PROCEDURE — 700101 HCHG RX REV CODE 250: Performed by: EMERGENCY MEDICINE

## 2025-01-06 PROCEDURE — 304999 HCHG REPAIR-SIMPLE/INTERMED LEVEL 1

## 2025-01-06 PROCEDURE — 70450 CT HEAD/BRAIN W/O DYE: CPT

## 2025-01-06 PROCEDURE — 84484 ASSAY OF TROPONIN QUANT: CPT

## 2025-01-06 RX ORDER — LIDOCAINE HYDROCHLORIDE AND EPINEPHRINE 10; 10 MG/ML; UG/ML
5 INJECTION, SOLUTION INFILTRATION; PERINEURAL ONCE
Status: COMPLETED | OUTPATIENT
Start: 2025-01-06 | End: 2025-01-06

## 2025-01-06 RX ADMIN — LIDOCAINE HYDROCHLORIDE AND EPINEPHRINE 5 ML: 10; 10 INJECTION, SOLUTION INFILTRATION; PERINEURAL at 06:50

## 2025-01-06 NOTE — ED NOTES
Patient discharge per order. Oral and written discharge instructions reviewed.  All belongings accounted for and taken with patient. Questions answered, and patient agrees with discharge plan. Encouraged to follow up with PCP.

## 2025-01-06 NOTE — ED TRIAGE NOTES
Chief Complaint   Patient presents with    Syncope    GLF     Pt was in the shower and sustained a syncopal event. Pt sustained a laceration to her chin and complains of right knee pain following the event. Pt is ambulatory but has minor swelling and redness to the knee.     Vitals:    01/06/25 0510   BP: (!) 97/36   Pulse: 72   Resp: 14   Temp: 35.9 °C (96.7 °F)   SpO2: 94%

## 2025-01-06 NOTE — ED PROVIDER NOTES
ED Provider Note    Scribed for Katy Beltran M.D. by Katy Beltran M.D.. 1/6/2025, 5:59 AM.    Primary care provider: YOAV Leal  Means of arrival: EMS  History obtained from: Patient  History limited by: None.     CHIEF COMPLAINT  Chief Complaint   Patient presents with    Syncope    GLF     Pt was in the shower and sustained a syncopal event. Pt sustained a laceration to her chin and complains of right knee pain following the event. Pt is ambulatory but has minor swelling and redness to the knee.       HPI/ROS  Nighat Arreguin is a 59 y.o. female who presents to the Emergency Department for evaluation following a syncopal episode and ground level fall, onset this morning. Patient reports that she felt lightheaded while she was in the shower today, and then fell to the ground, striking her face. Her  then found her and was able to wake her up. She has sustained two lacerations to her chin and reports some right knee discomfort. Patient's tetanus is up to date.  She notes that she had not eaten or had anything to drink prior to taking a hot shower this morning.  She is otherwise generally healthy, no prior episodes of syncope.    EXTERNAL RECORDS REVIEWED  None pertinent    LIMITATION TO HISTORY   Select: : None    OUTSIDE HISTORIAN(S):  None.       PAST MEDICAL HISTORY   None    SURGICAL HISTORY   has a past surgical history that includes mammoplasty augmentation (2001) and endometrial ablation.    SOCIAL HISTORY  Social History     Tobacco Use    Smoking status: Never    Smokeless tobacco: Never   Substance Use Topics    Alcohol use: No    Drug use: No      Social History     Substance and Sexual Activity   Drug Use No       FAMILY HISTORY  Family History   Problem Relation Age of Onset    Other Daughter         Migraines       CURRENT MEDICATIONS  Home Medications       Reviewed by Brian Ramos R.N. (Registered Nurse) on 01/06/25 at 0522  Med List Status: Not Addressed  "    Medication Last Dose Status   SUMAtriptan (IMITREX) 50 MG Tab  Active                  Audit from Redirected Encounters    **Home medications have not yet been reviewed for this encounter**         ALLERGIES  No Known Allergies    PHYSICAL EXAM  VITAL SIGNS: BP (!) 97/36   Pulse 72   Temp 35.9 °C (96.7 °F) (Temporal)   Resp 14   Ht 1.651 m (5' 5\")   Wt 56 kg (123 lb 7.3 oz)   SpO2 98%   BMI 20.54 kg/m²   Nursing note and vitals reviewed.  Constitutional: Well-developed and well-nourished. No acute distress.   HENT: Head is normocephalic. 2 lacerations to the chin, one 3 cm laceration one 2 cm laceration  Eyes: extra-ocular movements intact  Cardiovascular: Regular rate and regular rhythm. No murmur heard.  Pulmonary/Chest: Breath sounds normal. No wheezes or rales.   Abdominal: Soft and non-tender. No distention.    Musculoskeletal: Bruising to bilateral knees pain with range of motion of the right knee. No swelling or deformity noted. tenderness.   Neurological: Awake and alert, cranial nerves II through XII intact, no focal neurologic deficits  Skin: Skin is warm and dry. No rash.     DIAGNOSTIC STUDIES:  LABS  Labs Reviewed   COMP METABOLIC PANEL - Abnormal; Notable for the following components:       Result Value    Correct Calcium 8.3 (*)     All other components within normal limits   CBC WITH DIFFERENTIAL   TROPONIN   ESTIMATED GFR       All labs reviewed and independently interpreted by myself    EKG Interpretation:    12 Lead EKG independently interpreted by myself to show:  EKG at 5:15 AM: Normal sinus rhythm, heart rate 64, normal axis,  ,  with nonspecific intraventricular conduction delay, QTc 453, no acute ST-T segment changes, no evidence of acute arrhythmia or ischemia per my independent interpretation    RADIOLOGY  Images independently interpreted by myself prior to radiologist review:  -CT of the head demonstrates no acute intracranial hemorrhage  -Right knee x-ray " demonstrates no acute bony pathology    Final interpretation by radiology demonstrates:    CT-HEAD W/O   Final Result         1. No acute process in the brain evident.               DX-KNEE 3 VIEWS RIGHT   Final Result         1. No right knee joint effusion or fracture.      2. Minimal medial and lateral joint line osteophytes.                       The radiologist's interpretation of all radiological studies have been reviewed by me.      PROCEDURES  Laceration Repair Procedure Note    Indication: Laceration    Procedure: The patient was placed in the appropriate position and anesthesia around the lacerations were obtained by infiltration using 1% Lidocaine with epinephrine. The wound was minimally contaminated .The area was then irrigated with normal saline. The laceration was closed with 5-0 rapid absorbing gut using interrupted sutures. A second laceration was closed with 5-0 rapid absorbing gut using interrupted sutures. The wound area was then dressed with a sterile dressing.      Total repaired wound length: 3 cm and 2 cm.     Other Items: Suture count: 3 cm laceration required 6 sutures, and 2 cm laceration required 4 sutures.     The patient tolerated the procedure well.    Complications: None       COURSE & MEDICAL DECISION MAKING      INITIAL ASSESSMENT, ED COURSE AND PLAN    Patient is a 59-year-old female who presents for evaluation of syncope and fall.  Differential diagnose include syncope, arrhythmia, vasovagal event, orthostatic hypotension, arrhythmia, electrolyte derangement, closed injury, intracranial hemorrhage.  Diagnostic workup includes labs, EKG and CT of the head.    Patient initial vitals notable for borderline hypotension.  She is treated with oral fluid resuscitation.  EKG returns demonstrates no evidence of acute rhythm or ischemia.  Labs are unremarkable.  Electrolytes and renal function are within normal limits.  CT of the head demonstrates no acute findings.  At this time patient  is neurologically intact and therefore likely was an orthostatic versus vagal vasovagal syncopal event.  Laceration is repaired and she is advised on suture care.  She is advised on the importance of hydration and eating a good meal and resting today.  She is given strict return precautions and discharged in stable condition.       REASSESSMENTS     6:00 AM - Patient seen and examined at bedside. She arrives today after having a syncopal episode and falling. She has tow lacerations to her chin and some right knee pain. Discussed plan of care, including repairing her lacerations and evaluating for any traumatic injuries. Patient agrees to the plan of care. Ordered for EKG, Troponin, CMP, CBC with differential, CT-Head without, and DX-Knee 3 views right to evaluate her symptoms.       6:57 AM - Patient's lacerations were repaired by me at this time, as noted above without complication.    7:30 AM - I reevaluated the patient at bedside. The patient informs me they feel good following repair of lacerations without any new complaints. I discussed the patient's diagnostic study results which show no acute traumatic injury requiring hospitalization or urgent intervention. I discussed plan for discharge and follow up as outlined below. The patient is stable for discharge at this time and will return for any new or worsening symptoms. Patient verbalizes understanding and support with my plan for discharge.        DISPOSITION AND DISCUSSIONS  I have discussed management of the patient with the following physicians and JOVANI's:  None    Discussion of management with other QHP or appropriate source(s): None     Escalation of care considered, and ultimately not performed:acute inpatient care management, however at this time, the patient is most appropriate for outpatient management    Barriers to care at this time, including but not limited to:  None are known .     Decision tools and prescription drugs considered including, but  not limited to: see above.     The patient will return for new or worsening symptoms and is stable at the time of discharge.    DISPOSITION:  Patient will be discharged home in stable condition.    FOLLOW UP:  YOAV Leal  [address] [place] [id number]  [phone]          FINAL IMPRESSION  1. Syncope, unspecified syncope type    2. Closed head injury, initial encounter    3. Facial laceration, initial encounter          Katy MONTAGUE M.D. (Scribe), am scribing for, and in the presence of, Katy Beltran M.D..    Electronically signed by: Katy Beltran M.D. (Scribe), 1/6/2025    IKaty M.D. personally performed the services described in this documentation, as scribed by Katy Beltran M.D. in my presence, and it is both accurate and complete.    The note accurately reflects work and decisions made by me.  Katy Beltran M.D.  1/6/2025  7:42 AM

## 2025-04-25 ENCOUNTER — HOSPITAL ENCOUNTER (OUTPATIENT)
Dept: RADIOLOGY | Facility: MEDICAL CENTER | Age: 60
End: 2025-04-25
Attending: SPECIALIST
Payer: COMMERCIAL

## 2025-04-25 DIAGNOSIS — M85.9 LOW BONE DENSITY FOR AGE: ICD-10-CM

## 2025-04-25 PROCEDURE — 77080 DXA BONE DENSITY AXIAL: CPT

## 2025-07-07 ENCOUNTER — HOSPITAL ENCOUNTER (OUTPATIENT)
Dept: RADIOLOGY | Facility: MEDICAL CENTER | Age: 60
End: 2025-07-07
Attending: SPECIALIST
Payer: COMMERCIAL

## 2025-07-07 DIAGNOSIS — Z12.31 ENCOUNTER FOR MAMMOGRAM TO ESTABLISH BASELINE MAMMOGRAM: ICD-10-CM

## 2025-07-07 PROCEDURE — 77063 BREAST TOMOSYNTHESIS BI: CPT
